# Patient Record
Sex: MALE | Race: WHITE | Employment: STUDENT | URBAN - METROPOLITAN AREA
[De-identification: names, ages, dates, MRNs, and addresses within clinical notes are randomized per-mention and may not be internally consistent; named-entity substitution may affect disease eponyms.]

---
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
## (undated) NOTE — LETTER
October 23, 2017    Patient: Salvador Hill. Date of Visit: 10/23/2017       To Whom It May Concern:    Saint Alvine was seen and treated in our emergency department on 10/23/2017.  He should not participate in gym/sports until Cleared by h

## (undated) NOTE — ED AVS SNAPSHOT
Bruno Query. MRN: [id number]    Department:  [facility] Emergency Department   Date of Visit:  8/18/2019           Disclosure     Insurance plans vary and the physician(s) referred by the ER may not be covered by your plan.  Please c CARE PHYSICIAN AT ONCE OR RETURN IMMEDIATELY TO THE EMERGENCY DEPARTMENT. If you have been prescribed any medication(s), please fill your prescription right away and begin taking the medication(s) as directed.   If you believe that any of the medications

## (undated) NOTE — LETTER
Date & Time: 9/29/2018, 6:16 PM  Patient: Jono Jasso. Encounter Provider(s):    Graylon Councilman, [place]       To Whom It May Concern:    Zeyad Castelan was seen and treated in our department on 9/29/2018.  He should not participate in gym/spor